# Patient Record
Sex: MALE | Race: WHITE | Employment: FULL TIME | URBAN - METROPOLITAN AREA
[De-identification: names, ages, dates, MRNs, and addresses within clinical notes are randomized per-mention and may not be internally consistent; named-entity substitution may affect disease eponyms.]

---

## 2017-10-23 ENCOUNTER — HOSPITAL ENCOUNTER (EMERGENCY)
Facility: HOSPITAL | Age: 37
Discharge: HOME | End: 2017-10-23
Attending: EMERGENCY MEDICINE
Payer: COMMERCIAL

## 2017-10-23 VITALS
DIASTOLIC BLOOD PRESSURE: 84 MMHG | HEIGHT: 71 IN | BODY MASS INDEX: 32.9 KG/M2 | RESPIRATION RATE: 16 BRPM | WEIGHT: 235 LBS | SYSTOLIC BLOOD PRESSURE: 123 MMHG | TEMPERATURE: 98 F | OXYGEN SATURATION: 99 % | HEART RATE: 88 BPM

## 2017-10-23 DIAGNOSIS — L03.211 FACIAL CELLULITIS: Primary | ICD-10-CM

## 2017-10-23 PROCEDURE — 99283 EMERGENCY DEPT VISIT LOW MDM: CPT

## 2017-10-23 RX ORDER — CEPHALEXIN 500 MG/1
500 CAPSULE ORAL 4 TIMES DAILY
Qty: 28 CAPSULE | Refills: 0 | Status: SHIPPED | OUTPATIENT
Start: 2017-10-23 | End: 2017-10-30

## 2017-10-23 NOTE — ED INITIAL ASSESSMENT (HPI)
Bite to forehead by unknown bug, swelling to face. No sob. Patient states he first noticed bite on Saturday.

## 2017-10-24 NOTE — ED PROVIDER NOTES
Patient Seen in: [facility] Emergency Department    History   Patient presents with:  Bite (integumentary)    Stated Complaint: Bug bite to face    HPI    56-year-old male with no significant past medical history here with complaints of right woun All other systems reviewed and negative except as noted above. PSFH elements reviewed from today and agreed except as otherwise stated in HPI.     Physical Exam   ED Triage Vitals [10/23/17 1814]  BP: 135/90  Pulse: 100  Resp: 18  Temp: 98.5 °F (36.9 °C) No results found for this or any previous visit (from the past 24 hour(s)).     Imaging Results Available and Reviewed by me while in ED:          EMERGENCY DEPARTMENT COURSE AND TREATMENT:  Patient's condition was stable during Emergency Department evaluat Discharge    Follow-up:  Fartun Palmer MD  [address]  [address] [place] [address] Rd[address]  [phone]    Schedule an appointment as soon as possible for a visit in 4 days  For wound re-check      Medications Prescribed:  Current Discharge [address] [place] [address]

## 2017-10-24 NOTE — ED NOTES
Pt has area the size of a dime on right forehead that is red and swollen, and there is an area (the size of half dollar) of swelling around redness. Pt states that the area itches and hurts to touch.   There is also swelling and pain to right side of face

## 2019-10-15 ENCOUNTER — OFFICE VISIT (OUTPATIENT)
Dept: INTERNAL MEDICINE CLINIC | Facility: CLINIC | Age: 39
End: 2019-10-15
Payer: COMMERCIAL

## 2019-10-15 VITALS
DIASTOLIC BLOOD PRESSURE: 74 MMHG | SYSTOLIC BLOOD PRESSURE: 112 MMHG | HEIGHT: 71 IN | BODY MASS INDEX: 32.06 KG/M2 | TEMPERATURE: 98 F | WEIGHT: 229 LBS | HEART RATE: 83 BPM

## 2019-10-15 DIAGNOSIS — Z00.00 ANNUAL PHYSICAL EXAM: Primary | ICD-10-CM

## 2019-10-15 DIAGNOSIS — Z28.21 IMMUNIZATION NOT CARRIED OUT BECAUSE OF PATIENT REFUSAL: ICD-10-CM

## 2019-10-15 DIAGNOSIS — M79.674 PAIN OF TOE OF RIGHT FOOT: ICD-10-CM

## 2019-10-15 PROCEDURE — 99385 PREV VISIT NEW AGE 18-39: CPT | Performed by: INTERNAL MEDICINE

## 2019-10-15 RX ORDER — PENICILLIN V POTASSIUM 500 MG/1
500 TABLET ORAL
COMMUNITY
Start: 2019-10-08 | End: 2019-10-18

## 2019-10-15 NOTE — PROGRESS NOTES
Patient ID: Kaley Cedillo is a 46year old male. Patient presents with:  Physical: per pt will get flu shot at work next week        HISTORY OF PRESENT ILLNESS:   HPI  Patient presents for above.   New patient here for annual physical.    Denies any Inability: Not on file      Transportation needs:        Medical: Not on file        Non-medical: Not on file    Tobacco Use      Smoking status: Never Smoker      Smokeless tobacco: Never Used    Substance and Sexual Activity      Alcohol use:  Yes Abdominal: Soft. Bowel sounds are normal. He exhibits no distension. There is no tenderness. There is no rebound and no guarding. Hernia confirmed negative in the right inguinal area and confirmed negative in the left inguinal area.    Genitourinary:    Barby

## 2021-01-02 ENCOUNTER — OFFICE VISIT (OUTPATIENT)
Dept: INTERNAL MEDICINE CLINIC | Facility: CLINIC | Age: 41
End: 2021-01-02
Payer: COMMERCIAL

## 2021-01-02 VITALS
HEIGHT: 71 IN | DIASTOLIC BLOOD PRESSURE: 87 MMHG | RESPIRATION RATE: 16 BRPM | SYSTOLIC BLOOD PRESSURE: 128 MMHG | BODY MASS INDEX: 32.34 KG/M2 | HEART RATE: 86 BPM | WEIGHT: 231 LBS

## 2021-01-02 DIAGNOSIS — Z20.822 CLOSE EXPOSURE TO COVID-19 VIRUS: ICD-10-CM

## 2021-01-02 DIAGNOSIS — Z00.00 ANNUAL PHYSICAL EXAM: Primary | ICD-10-CM

## 2021-01-02 PROCEDURE — 3079F DIAST BP 80-89 MM HG: CPT | Performed by: INTERNAL MEDICINE

## 2021-01-02 PROCEDURE — 99395 PREV VISIT EST AGE 18-39: CPT | Performed by: INTERNAL MEDICINE

## 2021-01-02 PROCEDURE — 3074F SYST BP LT 130 MM HG: CPT | Performed by: INTERNAL MEDICINE

## 2021-01-02 PROCEDURE — 3008F BODY MASS INDEX DOCD: CPT | Performed by: INTERNAL MEDICINE

## 2021-01-02 NOTE — PROGRESS NOTES
Patient ID: Aryan Khanna is a 45year old male. Patient presents with:  Physical       HISTORY OF PRESENT ILLNESS:   HPI  Patient presents for above. New patient here for annual physical.     Denies any chronic medical problems.   States he may ha Financial resource strain: Not on file      Food insecurity        Worry: Not on file        Inability: Not on file      Transportation needs        Medical: Not on file        Non-medical: Not on file    Tobacco Use      Smoking status: Never Smoker has no rales. Abdominal: Soft. Bowel sounds are normal. He exhibits no distension. There is no abdominal tenderness. There is no rebound. Musculoskeletal: Normal range of motion. Neurological: He is alert.    Psychiatric: He has a normal mood and affe

## 2021-01-13 ENCOUNTER — LAB ENCOUNTER (OUTPATIENT)
Dept: LAB | Facility: HOSPITAL | Age: 41
End: 2021-01-13
Attending: INTERNAL MEDICINE
Payer: COMMERCIAL

## 2021-01-13 DIAGNOSIS — Z00.00 ANNUAL PHYSICAL EXAM: ICD-10-CM

## 2021-01-13 DIAGNOSIS — Z20.822 CLOSE EXPOSURE TO COVID-19 VIRUS: ICD-10-CM

## 2021-01-13 LAB
ALBUMIN SERPL-MCNC: 4.2 G/DL (ref 3.4–5)
ALBUMIN/GLOB SERPL: 1.1 {RATIO} (ref 1–2)
ALP LIVER SERPL-CCNC: 66 U/L
ALT SERPL-CCNC: 31 U/L
ANION GAP SERPL CALC-SCNC: 3 MMOL/L (ref 0–18)
AST SERPL-CCNC: 18 U/L (ref 15–37)
BASOPHILS # BLD AUTO: 0.04 X10(3) UL (ref 0–0.2)
BASOPHILS NFR BLD AUTO: 0.5 %
BILIRUB SERPL-MCNC: 1.1 MG/DL (ref 0.1–2)
BUN BLD-MCNC: 12 MG/DL (ref 7–18)
BUN/CREAT SERPL: 10.4 (ref 10–20)
CALCIUM BLD-MCNC: 9.3 MG/DL (ref 8.5–10.1)
CHLORIDE SERPL-SCNC: 107 MMOL/L (ref 98–112)
CHOLEST SMN-MCNC: 211 MG/DL (ref ?–200)
CO2 SERPL-SCNC: 30 MMOL/L (ref 21–32)
CREAT BLD-MCNC: 1.15 MG/DL
DEPRECATED RDW RBC AUTO: 44.6 FL (ref 35.1–46.3)
EOSINOPHIL # BLD AUTO: 0.06 X10(3) UL (ref 0–0.7)
EOSINOPHIL NFR BLD AUTO: 0.7 %
ERYTHROCYTE [DISTWIDTH] IN BLOOD BY AUTOMATED COUNT: 12.3 % (ref 11–15)
GLOBULIN PLAS-MCNC: 3.8 G/DL (ref 2.8–4.4)
GLUCOSE BLD-MCNC: 96 MG/DL (ref 70–99)
HCT VFR BLD AUTO: 49.7 %
HDLC SERPL-MCNC: 58 MG/DL (ref 40–59)
HGB BLD-MCNC: 16.2 G/DL
IMM GRANULOCYTES # BLD AUTO: 0.02 X10(3) UL (ref 0–1)
IMM GRANULOCYTES NFR BLD: 0.2 %
LDLC SERPL CALC-MCNC: 121 MG/DL (ref ?–100)
LYMPHOCYTES # BLD AUTO: 1.98 X10(3) UL (ref 1–4)
LYMPHOCYTES NFR BLD AUTO: 22.6 %
M PROTEIN MFR SERPL ELPH: 8 G/DL (ref 6.4–8.2)
MCH RBC QN AUTO: 31.4 PG (ref 26–34)
MCHC RBC AUTO-ENTMCNC: 32.6 G/DL (ref 31–37)
MCV RBC AUTO: 96.3 FL
MONOCYTES # BLD AUTO: 0.76 X10(3) UL (ref 0.1–1)
MONOCYTES NFR BLD AUTO: 8.7 %
NEUTROPHILS # BLD AUTO: 5.91 X10 (3) UL (ref 1.5–7.7)
NEUTROPHILS # BLD AUTO: 5.91 X10(3) UL (ref 1.5–7.7)
NEUTROPHILS NFR BLD AUTO: 67.3 %
NONHDLC SERPL-MCNC: 153 MG/DL (ref ?–130)
OSMOLALITY SERPL CALC.SUM OF ELEC: 290 MOSM/KG (ref 275–295)
PATIENT FASTING Y/N/NP: YES
PATIENT FASTING Y/N/NP: YES
PLATELET # BLD AUTO: 247 10(3)UL (ref 150–450)
POTASSIUM SERPL-SCNC: 3.8 MMOL/L (ref 3.5–5.1)
RBC # BLD AUTO: 5.16 X10(6)UL
SARS-COV-2 IGG+IGM SERPL QL IA: NONREACTIVE
SODIUM SERPL-SCNC: 140 MMOL/L (ref 136–145)
TRIGL SERPL-MCNC: 161 MG/DL (ref 30–149)
TSI SER-ACNC: 1.5 MIU/ML (ref 0.36–3.74)
VLDLC SERPL CALC-MCNC: 32 MG/DL (ref 0–30)
WBC # BLD AUTO: 8.8 X10(3) UL (ref 4–11)

## 2021-01-13 PROCEDURE — 85025 COMPLETE CBC W/AUTO DIFF WBC: CPT

## 2021-01-13 PROCEDURE — 86769 SARS-COV-2 COVID-19 ANTIBODY: CPT

## 2021-01-13 PROCEDURE — 36415 COLL VENOUS BLD VENIPUNCTURE: CPT

## 2021-01-13 PROCEDURE — 84443 ASSAY THYROID STIM HORMONE: CPT

## 2021-01-13 PROCEDURE — 80053 COMPREHEN METABOLIC PANEL: CPT

## 2021-01-13 PROCEDURE — 80061 LIPID PANEL: CPT

## 2021-05-28 ENCOUNTER — TELEPHONE (OUTPATIENT)
Dept: INTERNAL MEDICINE CLINIC | Facility: CLINIC | Age: 41
End: 2021-05-28

## 2021-05-28 NOTE — TELEPHONE ENCOUNTER
Pt. States that he needs to get a note from the doctor that states that the pt. Recently had a covid-19 test done, and that the results are negative. Pt. Requesting to get note sent to My Chart.

## 2021-06-01 NOTE — TELEPHONE ENCOUNTER
Patient returned call and stated, \"I went to  and got a rapid PCR done and I am negative for Covid but my employer is requesting a note that proves that I am negative and able to work.  Advised him per the CDC, his employer should not be requiring a note

## 2023-12-09 ENCOUNTER — HOSPITAL ENCOUNTER (OUTPATIENT)
Age: 43
Discharge: HOME | End: 2023-12-09
Payer: COMMERCIAL

## 2023-12-09 VITALS
HEART RATE: 81 BPM | TEMPERATURE: 98 F | DIASTOLIC BLOOD PRESSURE: 79 MMHG | SYSTOLIC BLOOD PRESSURE: 124 MMHG | OXYGEN SATURATION: 100 % | RESPIRATION RATE: 18 BRPM

## 2023-12-09 DIAGNOSIS — R50.9 FEVER: ICD-10-CM

## 2023-12-09 DIAGNOSIS — Z11.52 ENCOUNTER FOR SCREENING FOR COVID-19: ICD-10-CM

## 2023-12-09 DIAGNOSIS — J11.1 INFLUENZA: Primary | ICD-10-CM

## 2023-12-09 LAB
POCT INFLUENZA A: POSITIVE
POCT INFLUENZA B: POSITIVE
SARS-COV-2 RNA RESP QL NAA+PROBE: NOT DETECTED

## 2023-12-09 RX ORDER — OSELTAMIVIR PHOSPHATE 75 MG/1
75 CAPSULE ORAL 2 TIMES DAILY
Qty: 10 CAPSULE | Refills: 0 | Status: SHIPPED | OUTPATIENT
Start: 2023-12-09 | End: 2023-12-14

## 2023-12-09 NOTE — DISCHARGE INSTRUCTIONS
Continue take Tylenol and/or ibuprofen for fever, body aches or other discomfort. Push oral fluids, eat when you feel hungry and get as much rest as possible. Take any over-the-counter medicine to control your symptoms such as Mucinex DM which contains guaifenesin and dextromethorphan. If any shortness of breath, chest pain, or significant worsening of symptoms return here or go to the ER. Thank you for choosing our [facility] for your medical condition today. Please be sure to follow up with your primary care provider in 2 days if no improvement, or as directed. If any worsening of your symptoms or other concerns call your primary care provider, return here or go to the emergency department.

## (undated) NOTE — LETTER
11/14/19        Jose Almendarez 71      Dear Ana [place] records indicate that you have outstanding lab work and or testing that was ordered for you and has not yet been completed:  Orders Placed This Encounter

## (undated) NOTE — ED AVS SNAPSHOT
Dimple Nelson   MRN: [id number]    Department:  [facility] Emergency Department   Date of Visit:  10/23/2017           Disclosure     Insurance plans vary and the physician(s) referred by the ER may not be covered by your plan.  Please con CARE PHYSICIAN AT ONCE OR RETURN IMMEDIATELY TO THE EMERGENCY DEPARTMENT. If you have been prescribed any medication(s), please fill your prescription right away and begin taking the medication(s) as directed.   If you believe that any of the medications